# Patient Record
(demographics unavailable — no encounter records)

---

## 2025-05-23 NOTE — HISTORY OF PRESENT ILLNESS
[FreeTextEntry1] : 28 y/o P0 here for annual GYN visit.  Known patient to me.  Still taking [facility] for BCM, happy desires to continue.  No new sexual partners, monogamous with boyfriend of 5 years.  Will add on STI screening to PAP.  [Patient reported PAP Smear was normal] : Patient reported PAP Smear was normal [PapSmeardate] : 03/22

## 2025-05-23 NOTE — PHYSICAL EXAM
[RN] : RN [FreeTextEntry2] : Esme [Appropriately responsive] : appropriately responsive [Alert] : alert [No Acute Distress] : no acute distress [Soft] : soft [Non-tender] : non-tender [Non-distended] : non-distended [Oriented x3] : oriented x3 [Examination Of The Breasts] : a normal appearance [No Masses] : no breast masses were palpable [Labia Majora] : normal [Labia Minora] : normal [Normal] : normal [Uterine Adnexae] : normal